# Patient Record
Sex: FEMALE | Race: OTHER | Employment: FULL TIME | URBAN - METROPOLITAN AREA
[De-identification: names, ages, dates, MRNs, and addresses within clinical notes are randomized per-mention and may not be internally consistent; named-entity substitution may affect disease eponyms.]

---

## 2017-11-22 ENCOUNTER — APPOINTMENT (OUTPATIENT)
Dept: GENERAL RADIOLOGY | Age: 27
End: 2017-11-22
Attending: FAMILY MEDICINE
Payer: MEDICAID

## 2017-11-22 ENCOUNTER — HOSPITAL ENCOUNTER (OUTPATIENT)
Age: 27
Discharge: HOME | End: 2017-11-22
Attending: FAMILY MEDICINE
Payer: MEDICAID

## 2017-11-22 VITALS
RESPIRATION RATE: 18 BRPM | SYSTOLIC BLOOD PRESSURE: 122 MMHG | OXYGEN SATURATION: 99 % | DIASTOLIC BLOOD PRESSURE: 71 MMHG | TEMPERATURE: 100 F | HEART RATE: 78 BPM

## 2017-11-22 DIAGNOSIS — M25.532 LEFT WRIST PAIN: ICD-10-CM

## 2017-11-22 DIAGNOSIS — M65.4 DE QUERVAIN'S TENOSYNOVITIS, LEFT: Primary | ICD-10-CM

## 2017-11-22 PROCEDURE — 99203 OFFICE O/P NEW LOW 30 MIN: CPT

## 2017-11-22 PROCEDURE — 73110 X-RAY EXAM OF WRIST: CPT | Performed by: FAMILY MEDICINE

## 2017-11-22 PROCEDURE — 99204 OFFICE O/P NEW MOD 45 MIN: CPT

## 2017-11-22 RX ORDER — DICLOFENAC SODIUM 75 MG/1
75 TABLET, DELAYED RELEASE ORAL 2 TIMES DAILY
Qty: 20 TABLET | Refills: 0 | Status: SHIPPED | OUTPATIENT
Start: 2017-11-22

## 2017-11-22 NOTE — ED PROVIDER NOTES
Patient Seen in: [facility]    History   Patient presents with:  Wrist Pain    Stated Complaint: LT WRIST PAIN    HPI    This 27-year-old female presents with left wrist pain which is been ongoing for about a month.   She states lymphadenopathy. No thyromegaly,  HEART: Regular rate and rhythm, no S3, S4 or murmur noted. LUNGS: Clear to ausculation. No retractions or tachypnea noted. EXTREMITIES: The left arm is examined.   She has normal range of motion the left elbow with no ten de Quervain's tenosynovitis. Patient is placed in a thumb spica Velcro wrist splint. Symptomatic treatment is reviewed. A prescription for Voltaren 75 mg 1 tablet with breakfast and dinner for the next 10 days is given.   Usage and side effects are revie

## 2017-11-22 NOTE — ED INITIAL ASSESSMENT (HPI)
Complains of left wrist pain for the last one month after helping move and lift boxes. States pain has progressively gotten worse. Pain worse with certain movements.